# Patient Record
Sex: FEMALE | Race: WHITE | Employment: FULL TIME | URBAN - METROPOLITAN AREA
[De-identification: names, ages, dates, MRNs, and addresses within clinical notes are randomized per-mention and may not be internally consistent; named-entity substitution may affect disease eponyms.]

---

## 2017-11-07 PROCEDURE — 87491 CHLMYD TRACH DNA AMP PROBE: CPT | Performed by: OBSTETRICS & GYNECOLOGY

## 2017-11-07 PROCEDURE — 87591 N.GONORRHOEAE DNA AMP PROB: CPT | Performed by: OBSTETRICS & GYNECOLOGY

## 2017-11-07 PROCEDURE — 87086 URINE CULTURE/COLONY COUNT: CPT | Performed by: OBSTETRICS & GYNECOLOGY

## 2017-12-05 PROBLEM — E66.9 OBESITY: Status: ACTIVE | Noted: 2017-12-05

## 2017-12-05 PROBLEM — O09.90 SUPERVISION OF HIGH RISK PREGNANCY, ANTEPARTUM: Status: ACTIVE | Noted: 2017-12-05

## 2018-03-09 PROBLEM — E66.01 MORBID OBESITY WITH BMI OF 45.0-49.9, ADULT (HCC): Status: ACTIVE | Noted: 2018-03-09

## 2018-03-09 PROBLEM — E66.01 MORBID OBESITY WITH BMI OF 45.0-49.9, ADULT (HCC): Status: ACTIVE | Noted: 2018-01-01

## 2018-03-09 PROCEDURE — 36415 COLL VENOUS BLD VENIPUNCTURE: CPT | Performed by: OBSTETRICS & GYNECOLOGY

## 2018-03-09 PROCEDURE — 82950 GLUCOSE TEST: CPT | Performed by: OBSTETRICS & GYNECOLOGY

## 2018-04-25 PROCEDURE — 85025 COMPLETE CBC W/AUTO DIFF WBC: CPT | Performed by: OBSTETRICS & GYNECOLOGY

## 2018-04-25 PROCEDURE — 36415 COLL VENOUS BLD VENIPUNCTURE: CPT | Performed by: OBSTETRICS & GYNECOLOGY

## 2018-04-25 PROCEDURE — 87389 HIV-1 AG W/HIV-1&-2 AB AG IA: CPT | Performed by: OBSTETRICS & GYNECOLOGY

## 2018-04-25 PROCEDURE — 86780 TREPONEMA PALLIDUM: CPT | Performed by: OBSTETRICS & GYNECOLOGY

## 2018-05-17 PROCEDURE — 87653 STREP B DNA AMP PROBE: CPT | Performed by: OBSTETRICS & GYNECOLOGY

## 2018-05-17 PROCEDURE — 87081 CULTURE SCREEN ONLY: CPT | Performed by: OBSTETRICS & GYNECOLOGY

## 2018-06-05 PROBLEM — E66.01 MORBID OBESITY WITH BMI OF 50.0-59.9, ADULT (HCC): Status: ACTIVE | Noted: 2018-03-09

## 2018-06-15 PROBLEM — E66.01 MORBID OBESITY WITH BMI OF 50.0-59.9, ADULT (HCC): Status: RESOLVED | Noted: 2018-03-09 | Resolved: 2018-06-14

## 2018-06-15 PROBLEM — O09.90 SUPERVISION OF HIGH RISK PREGNANCY, ANTEPARTUM: Status: RESOLVED | Noted: 2017-12-05 | Resolved: 2018-06-14

## 2018-06-15 PROBLEM — E66.9 OBESITY: Status: RESOLVED | Noted: 2017-12-05 | Resolved: 2018-06-14

## 2018-09-12 PROBLEM — E66.01 MORBID OBESITY WITH BMI OF 50.0-59.9, ADULT (HCC): Status: ACTIVE | Noted: 2018-09-12

## 2019-10-04 PROCEDURE — 88175 CYTOPATH C/V AUTO FLUID REDO: CPT | Performed by: OBSTETRICS & GYNECOLOGY

## 2020-08-07 PROBLEM — E66.01 MORBID OBESITY WITH BMI OF 50.0-59.9, ADULT (HCC): Status: RESOLVED | Noted: 2018-09-12 | Resolved: 2020-08-07

## 2020-08-07 PROBLEM — F17.200 SMOKER: Status: ACTIVE | Noted: 2020-08-07

## 2020-10-15 PROBLEM — O99.210 OBESITY IN PREGNANCY: Status: ACTIVE | Noted: 2017-12-05

## 2020-10-28 PROBLEM — O09.90 SUPERVISION OF HIGH RISK PREGNANCY, ANTEPARTUM: Status: ACTIVE | Noted: 2020-10-28

## 2020-12-08 ENCOUNTER — HOSPITAL ENCOUNTER (OUTPATIENT)
Dept: PERINATAL CARE | Facility: HOSPITAL | Age: 30
Discharge: HOME | End: 2020-12-08
Attending: PEDIATRICS
Payer: COMMERCIAL

## 2020-12-08 DIAGNOSIS — Z36.83 ENCOUNTER FOR FETAL SCREENING FOR CONGENITAL CARDIAC ABNORMALITIES: ICD-10-CM

## 2020-12-08 DIAGNOSIS — O99.210 OBESITY IN PREGNANCY: ICD-10-CM

## 2020-12-08 DIAGNOSIS — O99.210 OBESITY IN PREGNANCY: Primary | ICD-10-CM

## 2020-12-08 DIAGNOSIS — IMO0002 EVALUATE ANATOMY NOT SEEN ON PRIOR SONOGRAM: ICD-10-CM

## 2020-12-08 PROCEDURE — 93325 DOPPLER ECHO COLOR FLOW MAPG: CPT

## 2020-12-08 PROCEDURE — 76825 ECHO EXAM OF FETAL HEART: CPT | Performed by: PEDIATRICS

## 2020-12-08 PROCEDURE — 76827 ECHO EXAM OF FETAL HEART: CPT

## 2020-12-08 NOTE — PROGRESS NOTES
CARDIOLOGY CONSULTATION AND FETAL ECHOCARDIOGRAM :       Dear Dr. Ivan Eddy you for requesting fetal echocardiogram and  cardiology consultation on your patient Alonso Madison.   As you are aware she is a 27year old female  Use      Smoking status: Current Every Day Smoker        Packs/day: 1.00      Smokeless tobacco: Never Used    Alcohol use: Yes      Comment: socially; she is avoiding it while pregnant    Drug use: Not Currently      Types: Cannabis      Comment: she stop Doppler (Qualitatively): Flow through foramen ovale: right-left shunt (normal).  Tricuspid valve flow: normal. Mitral valve flow: normal. Ventricular septum: normal. RVOT / Pulmonary valve flow: normal. LVOT / Aortic valve flow: normal. Flow in ductus art

## 2020-12-10 NOTE — ADDENDUM NOTE
Encounter addended by: Tea Bhatt on: 12/10/2020 2:49 PM   Actions taken: Visit diagnoses modified, Charge Capture section accepted

## 2021-03-23 PROBLEM — O09.90 SUPERVISION OF HIGH RISK PREGNANCY, ANTEPARTUM: Status: RESOLVED | Noted: 2020-10-28 | Resolved: 2021-03-23

## 2021-03-23 PROBLEM — O99.210 OBESITY IN PREGNANCY: Status: RESOLVED | Noted: 2017-12-05 | Resolved: 2021-03-23
